# Patient Record
Sex: FEMALE | Race: WHITE | NOT HISPANIC OR LATINO | Employment: OTHER | URBAN - METROPOLITAN AREA
[De-identification: names, ages, dates, MRNs, and addresses within clinical notes are randomized per-mention and may not be internally consistent; named-entity substitution may affect disease eponyms.]

---

## 2017-11-17 NOTE — PATIENT DISCUSSION
Discussed condition and exacerbating conditions/situations (e.gn., dry/arid environments, overhead fans, air conditioners, side effect of medications).

## 2017-11-17 NOTE — PATIENT DISCUSSION
Cataract(s) are not yet visually significant to the patient. Recommend monitoring, and patient agrees with this plan. UV protection.

## 2018-03-06 ENCOUNTER — ESTABLISHED PATIENT (OUTPATIENT)
Dept: URBAN - METROPOLITAN AREA CLINIC 39 | Facility: CLINIC | Age: 78
End: 2018-03-06

## 2018-03-06 DIAGNOSIS — H18.51: ICD-10-CM

## 2018-03-06 DIAGNOSIS — H43.813: ICD-10-CM

## 2018-03-06 DIAGNOSIS — E11.9: ICD-10-CM

## 2018-03-06 DIAGNOSIS — H26.491: ICD-10-CM

## 2018-03-06 DIAGNOSIS — H35.373: ICD-10-CM

## 2018-03-06 PROCEDURE — G8756 NO BP MEASURE DOC: HCPCS

## 2018-03-06 PROCEDURE — 2022F DILAT RTA XM EVC RTNOPTHY: CPT

## 2018-03-06 PROCEDURE — G8427 DOCREV CUR MEDS BY ELIG CLIN: HCPCS

## 2018-03-06 PROCEDURE — 1036F TOBACCO NON-USER: CPT

## 2018-03-06 PROCEDURE — 3072F LOW RISK FOR RETINOPATHY: CPT

## 2018-03-06 PROCEDURE — 92014 COMPRE OPH EXAM EST PT 1/>: CPT

## 2018-03-06 PROCEDURE — 92015 DETERMINE REFRACTIVE STATE: CPT

## 2018-03-06 PROCEDURE — 92134 CPTRZ OPH DX IMG PST SGM RTA: CPT

## 2018-03-06 ASSESSMENT — TONOMETRY
OD_IOP_MMHG: 13
OS_IOP_MMHG: 14

## 2018-03-06 ASSESSMENT — VISUAL ACUITY
OS_CC: 20/40
OS_SC: 20/40
OD_SC: 20/25-1
OD_CC: 20/40
OD_BAT: 20/60 WITH MR
OD_CC: J3
OS_CC: J3

## 2018-05-09 NOTE — PATIENT DISCUSSION
Continue artificial tears as directed, Gel QHS.   pt has not noticed significant difference in symptoms, hold on replacing LLL plug.

## 2018-05-09 NOTE — PATIENT DISCUSSION
Patient understands condition, prognosis and need for follow up care. IOP stable. HVF stable. No drops recommended at this time.

## 2019-05-28 NOTE — PROCEDURE NOTE: CLINICAL
PROCEDURE NOTE: Punctal Plugs, David Melendez ([id number], [id number]) #1 OU. Diagnosis: Keratoconjunctivitis Sicca, Not Specified As Sjögren's. PP BLL inserted today. No complications. Quintess 0.5mm/0.5mm.

## 2019-08-23 ENCOUNTER — ESTABLISHED COMPREHENSIVE EXAM (OUTPATIENT)
Dept: URBAN - METROPOLITAN AREA CLINIC 39 | Facility: CLINIC | Age: 79
End: 2019-08-23

## 2019-08-23 DIAGNOSIS — H34.8322: ICD-10-CM

## 2019-08-23 DIAGNOSIS — H18.51: ICD-10-CM

## 2019-08-23 DIAGNOSIS — E11.9: ICD-10-CM

## 2019-08-23 DIAGNOSIS — H35.373: ICD-10-CM

## 2019-08-23 DIAGNOSIS — H26.491: ICD-10-CM

## 2019-08-23 DIAGNOSIS — H43.813: ICD-10-CM

## 2019-08-23 PROCEDURE — 92250 FUNDUS PHOTOGRAPHY W/I&R: CPT

## 2019-08-23 PROCEDURE — 92014 COMPRE OPH EXAM EST PT 1/>: CPT

## 2019-08-23 PROCEDURE — 92015 DETERMINE REFRACTIVE STATE: CPT

## 2019-08-23 ASSESSMENT — VISUAL ACUITY
OD_CC: J2
OS_CC: 20/40
OD_SC: J10
OU_CC: 20/30
OU_SC: 20/25
OS_SC: 20/30
OD_SC: 20/30
OS_SC: J8
OU_SC: J6
OS_CC: J3
OD_CC: 20/30+2
OU_CC: J1

## 2019-08-23 ASSESSMENT — TONOMETRY
OS_IOP_MMHG: 14
OD_IOP_MMHG: 13

## 2019-10-18 ENCOUNTER — DILATED FUNDUS EXAM (OUTPATIENT)
Dept: URBAN - METROPOLITAN AREA CLINIC 39 | Facility: CLINIC | Age: 79
End: 2019-10-18

## 2019-10-18 DIAGNOSIS — H35.373: ICD-10-CM

## 2019-10-18 DIAGNOSIS — H34.8322: ICD-10-CM

## 2019-10-18 PROCEDURE — 92012 INTRM OPH EXAM EST PATIENT: CPT

## 2019-10-18 ASSESSMENT — VISUAL ACUITY
OS_CC: 20/40
OS_SC: 20/30
OD_SC: 20/30
OD_CC: 20/30

## 2019-10-18 ASSESSMENT — TONOMETRY
OD_IOP_MMHG: 18
OS_IOP_MMHG: 18

## 2019-11-27 NOTE — PROCEDURE NOTE: CLINICAL
PROCEDURE NOTE: Punctal Plugs, Tereza Worrell ([id number], [id number]) #1 OU. Diagnosis: Keratoconjunctivitis Sicca, Not Specified As Sjögren's. 0.5mm/0.5mm. No complications. Liss Lundberg

## 2020-05-21 NOTE — PROCEDURE NOTE: CLINICAL
PROCEDURE NOTE: Punctal Plugs, Coco Harmon ([id number], [id number]) #2 OU. Diagnosis: Keratoconjunctivitis Sicca, Not Specified As Sjögren's. Prior to treatment, the risks/benefits/alternatives were discussed. The patient wished to proceed with procedure. Temporary collagen plugs were inserted. Patient tolerated procedure well. There were no complications. Post procedure instructions given. 0.5/0.5mm.

## 2020-09-29 ENCOUNTER — APPOINTMENT (RX ONLY)
Dept: URBAN - METROPOLITAN AREA CLINIC 151 | Facility: CLINIC | Age: 80
Setting detail: DERMATOLOGY
End: 2020-09-29

## 2020-09-29 DIAGNOSIS — L82.1 OTHER SEBORRHEIC KERATOSIS: ICD-10-CM

## 2020-09-29 DIAGNOSIS — L57.0 ACTINIC KERATOSIS: ICD-10-CM

## 2020-09-29 DIAGNOSIS — Z71.89 OTHER SPECIFIED COUNSELING: ICD-10-CM

## 2020-09-29 DIAGNOSIS — D18.0 HEMANGIOMA: ICD-10-CM

## 2020-09-29 PROBLEM — D18.01 HEMANGIOMA OF SKIN AND SUBCUTANEOUS TISSUE: Status: ACTIVE | Noted: 2020-09-29

## 2020-09-29 PROCEDURE — 17000 DESTRUCT PREMALG LESION: CPT

## 2020-09-29 PROCEDURE — ? ADDITIONAL NOTES

## 2020-09-29 PROCEDURE — ? FULL BODY SKIN EXAM

## 2020-09-29 PROCEDURE — 99203 OFFICE O/P NEW LOW 30 MIN: CPT | Mod: 25

## 2020-09-29 PROCEDURE — ? LIQUID NITROGEN

## 2020-09-29 PROCEDURE — ? COUNSELING

## 2020-09-29 ASSESSMENT — LOCATION SIMPLE DESCRIPTION DERM
LOCATION SIMPLE: LEFT UPPER ARM
LOCATION SIMPLE: ABDOMEN
LOCATION SIMPLE: RIGHT FOREARM
LOCATION SIMPLE: RIGHT CHEEK
LOCATION SIMPLE: RIGHT SHOULDER
LOCATION SIMPLE: RIGHT UPPER BACK
LOCATION SIMPLE: CHEST
LOCATION SIMPLE: LEFT ANTERIOR NECK

## 2020-09-29 ASSESSMENT — LOCATION ZONE DERM
LOCATION ZONE: NECK
LOCATION ZONE: ARM
LOCATION ZONE: FACE
LOCATION ZONE: TRUNK

## 2020-09-29 ASSESSMENT — LOCATION DETAILED DESCRIPTION DERM
LOCATION DETAILED: MIDDLE STERNUM
LOCATION DETAILED: RIGHT VENTRAL PROXIMAL FOREARM
LOCATION DETAILED: EPIGASTRIC SKIN
LOCATION DETAILED: RIGHT MEDIAL UPPER BACK
LOCATION DETAILED: RIGHT INFERIOR MEDIAL UPPER BACK
LOCATION DETAILED: RIGHT SUPERIOR MEDIAL MALAR CHEEK
LOCATION DETAILED: RIGHT LATERAL BUCCAL CHEEK
LOCATION DETAILED: RIGHT ANTERIOR SHOULDER
LOCATION DETAILED: LEFT INFERIOR ANTERIOR NECK
LOCATION DETAILED: LEFT ANTERIOR DISTAL UPPER ARM

## 2020-10-09 ENCOUNTER — ESTABLISHED COMPREHENSIVE EXAM (OUTPATIENT)
Dept: URBAN - METROPOLITAN AREA CLINIC 39 | Facility: CLINIC | Age: 80
End: 2020-10-09

## 2020-10-09 DIAGNOSIS — E11.9: ICD-10-CM

## 2020-10-09 DIAGNOSIS — H34.8322: ICD-10-CM

## 2020-10-09 DIAGNOSIS — H18.513: ICD-10-CM

## 2020-10-09 DIAGNOSIS — H35.373: ICD-10-CM

## 2020-10-09 DIAGNOSIS — H52.201: ICD-10-CM

## 2020-10-09 DIAGNOSIS — H52.12: ICD-10-CM

## 2020-10-09 DIAGNOSIS — H43.813: ICD-10-CM

## 2020-10-09 DIAGNOSIS — H26.491: ICD-10-CM

## 2020-10-09 DIAGNOSIS — H52.4: ICD-10-CM

## 2020-10-09 PROCEDURE — 92015 DETERMINE REFRACTIVE STATE: CPT

## 2020-10-09 PROCEDURE — 92014 COMPRE OPH EXAM EST PT 1/>: CPT

## 2020-10-09 ASSESSMENT — TONOMETRY
OD_IOP_MMHG: 16
OS_IOP_MMHG: 17

## 2020-10-09 ASSESSMENT — VISUAL ACUITY
OD_CC: 20/20-1
OS_SC: 20/40+2
OS_SC: J10
OS_CC: 20/20-2
OD_CC: J1+
OD_SC: J10
OD_SC: 20/20
OS_CC: J2

## 2020-11-23 NOTE — PROCEDURE NOTE: CLINICAL
PROCEDURE NOTE: Punctal Plugs, Messi Bateman ([id number], [id number]) #1 OU. Diagnosis: Keratoconjunctivitis Sicca, Not Specified As Sjögren's. Prior to treatment, the risks/benefits/alternatives were discussed. The patient wished to proceed with procedure. Temporary collagen plugs were inserted. Patient tolerated procedure well. There were no complications. Post procedure instructions given. Charla Carreon

## 2021-05-24 NOTE — PROCEDURE NOTE: CLINICAL
PROCEDURE NOTE: Punctal Plugs, Ivone Martínez ([id number], [id number]) #1 OU. Diagnosis: Keratoconjunctivitis Sicca, Not Specified As Sjögren's. Prior to treatment, the risks/benefits/alternatives were discussed. The patient wished to proceed with procedure. Temporary collagen plugs were inserted. Patient tolerated procedure well. There were no complications. Post procedure instructions given. Martin Zhu

## 2021-11-22 NOTE — PROCEDURE NOTE: CLINICAL
PROCEDURE NOTE: Punctal Plugs, Tereza Worrell ([id number], [id number]) #1 OU. Diagnosis: Keratoconjunctivitis Sicca, Not Specified As Sjögren's. Prior to treatment, the risks/benefits/alternatives were discussed. The patient wished to proceed with procedure. Temporary collagen plugs were inserted. Patient tolerated procedure well. There were no complications. Post procedure instructions given. 0.5/0.5 BLL Quintess.

## 2022-04-14 ENCOUNTER — OFFICE VISIT (OUTPATIENT)
Dept: URBAN - METROPOLITAN AREA CLINIC 63 | Facility: CLINIC | Age: 82
End: 2022-04-14

## 2022-04-27 ENCOUNTER — OFFICE VISIT (OUTPATIENT)
Dept: URBAN - METROPOLITAN AREA CLINIC 60 | Facility: CLINIC | Age: 82
End: 2022-04-27

## 2022-05-23 NOTE — PATIENT DISCUSSION
Reinserted punctal plugs BLL 2.3KL/4.8UM no complications. · Continue losartan 25 mg and amlodipine 10 mg daily.

## 2022-07-09 ENCOUNTER — TELEPHONE ENCOUNTER (OUTPATIENT)
Dept: URBAN - METROPOLITAN AREA CLINIC 121 | Facility: CLINIC | Age: 82
End: 2022-07-09

## 2022-07-09 RX ORDER — TRAMADOL HYDROCHLORIDE 50 MG/1
TABLET ORAL
Refills: 0 | OUTPATIENT
Start: 2022-01-15 | End: 2022-04-26

## 2022-07-09 RX ORDER — HYDROMORPHONE HYDROCHLORIDE 1 MG/ML
SOLUTION ORAL
Refills: 0 | OUTPATIENT
Start: 2022-04-20 | End: 2022-04-26

## 2022-07-09 RX ORDER — HALOPERIDOL 2 MG/ML
SOLUTION ORAL
Refills: 0 | OUTPATIENT
Start: 2022-04-08 | End: 2022-04-26

## 2022-07-10 ENCOUNTER — TELEPHONE ENCOUNTER (OUTPATIENT)
Dept: URBAN - METROPOLITAN AREA CLINIC 121 | Facility: CLINIC | Age: 82
End: 2022-07-10

## 2022-07-10 RX ORDER — SPIRONOLACTONE 50 MG/1
TABLET, FILM COATED ORAL
Refills: 0 | Status: ACTIVE | COMMUNITY
Start: 2022-03-25

## 2022-07-10 RX ORDER — LOSARTAN POTASSIUM 50 MG/1
TABLET, FILM COATED ORAL
Refills: 0 | Status: ACTIVE | COMMUNITY
Start: 2022-03-21

## 2022-07-10 RX ORDER — FUROSEMIDE 20 MG/1
TABLET ORAL
Refills: 0 | Status: ACTIVE | COMMUNITY
Start: 2022-03-21

## 2022-07-10 RX ORDER — CARVEDILOL 6.25 MG/1
TABLET, FILM COATED ORAL
Refills: 0 | Status: ACTIVE | COMMUNITY
Start: 2022-01-13

## 2022-07-10 RX ORDER — LEVOTHYROXINE SODIUM 125 UG/1
TABLET ORAL
Refills: 0 | Status: ACTIVE | COMMUNITY
Start: 2021-12-14

## 2022-07-10 RX ORDER — LORAZEPAM 0.5 MG/1
TABLET ORAL
Refills: 0 | Status: ACTIVE | COMMUNITY
Start: 2022-04-08

## 2022-07-10 RX ORDER — LACTULOSE 10 G/15ML
SOLUTION ORAL
Refills: 0 | Status: ACTIVE | COMMUNITY
Start: 2022-02-15

## 2022-07-10 RX ORDER — FAMOTIDINE 40 MG/1
TABLET, FILM COATED ORAL
Refills: 0 | Status: ACTIVE | COMMUNITY
Start: 2022-03-25

## 2022-07-10 RX ORDER — RIVAROXABAN 20 MG/1
TABLET, FILM COATED ORAL
Refills: 0 | Status: ACTIVE | COMMUNITY
Start: 2022-03-18

## 2022-07-10 RX ORDER — GLIMEPIRIDE 1 MG/1
TABLET ORAL
Refills: 0 | Status: ACTIVE | COMMUNITY
Start: 2021-12-14

## 2022-09-01 NOTE — PATIENT DISCUSSION
Continue monitoring. No glaucomatous findings at this time. Patient has been scheduled, she will be coming in tomorrow.     Allison

## 2022-11-23 NOTE — PROCEDURE NOTE: CLINICAL
PROCEDURE NOTE: Punctal Plugs, Dissolvable #1 OU. Diagnosis: Keratoconjunctivitis Sicca, Not Specified As Sjögren's. Anesthesia: Proparacaine 0.5%. Prior to treatment, the risks/benefits/alternatives were discussed. The patient wished to proceed with procedure. 1 drop of Proparacaine 0.5% was instilled. Puncta was dilated with punctal dilator. Dissolvable punctal plugs were inserted. Size/location of plugs inserted: 0.5/0.5 BLL. The patient tolerated the procedure well. There were no complications. Post procedure instructions given. Minal Batres
